# Patient Record
Sex: FEMALE | Race: WHITE | URBAN - METROPOLITAN AREA
[De-identification: names, ages, dates, MRNs, and addresses within clinical notes are randomized per-mention and may not be internally consistent; named-entity substitution may affect disease eponyms.]

---

## 2017-03-21 ENCOUNTER — OFFICE VISIT (OUTPATIENT)
Dept: DERMATOLOGY | Facility: CLINIC | Age: 27
End: 2017-03-21

## 2017-03-21 VITALS — DIASTOLIC BLOOD PRESSURE: 76 MMHG | SYSTOLIC BLOOD PRESSURE: 112 MMHG | HEART RATE: 68 BPM

## 2017-03-21 DIAGNOSIS — L65.9 LOSS OF HAIR: Primary | ICD-10-CM

## 2017-03-21 DIAGNOSIS — L21.9 DERMATITIS, SEBORRHEIC: ICD-10-CM

## 2017-03-21 ASSESSMENT — PAIN SCALES - GENERAL: PAINLEVEL: NO PAIN (0)

## 2017-03-21 NOTE — NURSING NOTE
"Dermatology Rooming Note    Cary Flores's goals for this visit include:   Chief Complaint   Patient presents with     Derm Problem     Patient comes to clinic today for hairloss. States \"I think it's better.\"    Katt Reynolds, [facility]    "

## 2017-03-21 NOTE — PROGRESS NOTES
"[facility] Dermatology Note    Dermatology Problem List:  1. Androgenetic alopecia with seborrheic dermatitis  -current treatment: ketoconazole 2x per week, DHS shampoo 2-3x per week, spironolactone 100 mg q AM and 50 mg q PM, HairMax laserband 3x per week, and Vit D supplementation 2000 IU daily  -last labs checked (9/30/16) K, ferritin, iron, iron binding capacity, TSH, and Vit D: wnl aside from slightly low Vit D at 26.  -previous treatments: Rogaine (ineffective)    Encounter Date: Mar 21, 2017    CC:  Chief Complaint   Patient presents with     Derm Problem     Patient comes to clinic today for hairloss. States \"I think it's better.\"     History of Present Illness:  Ms. Cary Flores is a 26 year old female who presents as a follow-up for androgenetic alopecia. The patient was last seen 9/30/16 when her androgenetic alopecia was stable.  She is currently treating her scalp with ketoconazole 2% shampoo daily, spironolactone 50 mg bid, and HairMax laserband 2-3x per week.  She is currently washing her hair every other day, and also washes her scalp with Nizoral number 5 shampoo/conditioner.  At her last visit, labs were checked which included K, ferritin, iron, iron binding capacity, TSH, and Vit D which were all within normal limits except for a mildly low vit D at 26. Her ferritin was 106, and TSH was 1.25, which were both within normal limits.    Ms. Flores initially thought that her hair loss has improved since her last visit, but when she reviewed her pictures from her last visit, she does not think that her hair loss has changed. She reports that her scalp is tender, but denies scalp pain, burning or pruritus.  She denies hair loss elsewhere on her body such as eyebrows or eyelashes. Since her last visit, she has stopped taking Wellbutrin since she has not been able to refill find a provider to refill her prescription in [place], [place]. Recently, she has been diagnosed with HPV and " underwent a conoscopy. She denies any additional changes in her medications or past medical history.      Past Medical History:   Patient Active Problem List   Diagnosis     Loss of hair     Dermatitis, seborrheic     History reviewed. No pertinent past medical history.  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    Family History:  There is no family history of hair loss.     Medications:  Current Outpatient Prescriptions   Medication Sig Dispense Refill     ketoconazole (NIZORAL) 2 % shampoo Use to shampoo scalp/hair; lather into scalp and leave in for 3 minutes and wash out twice per week 120 mL 3     spironolactone (ALDACTONE) 50 MG tablet Take 1 tablet (50 mg) by mouth 2 times daily 180 tablet 3     HYDROXYZINE HCL PO Take 25 mg by mouth as needed Every to 6 hours PRN       clobetasol propionate 0.05 % SHAM Apply to dry scalp, leave in for 10 minutes and wash out; do this once weekly. 118 mL 3     PENICILLIN V POTASSIUM PO Take 2 tablets by mouth every 12 hours Reported on 3/21/2017       Fluocinolone Acetonide (DERMA-SMOOTHE/FS SCALP) 0.01 % OIL Use this once per week overnight, may use up to twice per week. (Patient not taking: Reported on 3/21/2017) 118 mL 3     BuPROPion HCl (WELLBUTRIN PO) Take 150 mg by mouth 2 times daily Reported on 3/21/2017       No Known Allergies    Review of Systems:  -Constitutional: The patient denies fatigue, fevers, chills, unintended weight loss, and night sweats.  -HEENT: Patient denies nonhealing oral sores.  -Skin: As above in HPI. No additional skin concerns.    Physical exam:  Vitals: /76 (BP Location: Left arm, Patient Position: Chair, Cuff Size: Adult Regular)  Pulse 68  GEN: This is a well developed, well-nourished female in no acute distress, in a pleasant mood.    SKIN: Focused examination of the face, scalp, and fingernails  was performed.  -midline part width 1.2  -regrowth layers:    1st layer: 1-2 cm fibers   2nd layer: 4-5 cm fibers with 80% of the fibers containing blunt ends   3rd layer 8-10 cm fibers with blunt ends present   4th layer: 12-14 cm fibers   -patchy focal pinkness present on frontotemporal scalp  -mild follicular accentuation and scale present  -no hair loss present on eyebrows or eyelashes  -mild longitudinal ridging present on bilateral fingernails  -No other lesions of concern on areas examined.     Impression/Plan:  1. Androgenetic alopecia: Increased scalp regrowth evident by a tighter midline part width present on examination when compared to last visit photos.  Patchy, focal pinkness present along scalp and given reported mild scalp tingling after application of the Nioxin shampoo, there is concern that an irritant or contact dermatitis is present resulting in scalp inflammation    Recommended Ms. Flores perform a use test with the Nixoin shampoo/conditioner on her volar wrist. It reaction occurs, stop use of shampoo/conditioner.    Advised switching to DHS clear shampoo to wash scalp on days not using the ketoconazole shampoo.      Continue to use ketoconazole 2% shampoo at least 2x per week    Increase dose of spironolactone to 100 mg q AM and 50 mg q PM    Continue HairMax laserband 3x per week    Given borderline low Vit D when checked at her last visit (on 9/30/16) recommended starting an OTC Vit D supplement 2000 IU daily    Provided information on a dermatologist in [place], [place] who trained at the [facility], Dr. Anahi Barlow, if she needs a dermatologist closer to where she lives.     Photographs taken for future reference     Follow-up in 4 months, earlier for new or changing lesions.     Staff Involved:  Scribed by Anjana Turner, MS4 for Dr. Hall.        I agree with the PFSH and ROS as completed by the Medical Student. The remainder of the encounter was performed by me and scribed by the  Medical Student. The scribed note accurately reflects my personal services and the medical decisions made by me.      Michelle Hall MD  Professor and Chair  Department of Dermatology  [facility]    Cc  Dr. Anahi Barlow, [place], [place]

## 2017-03-21 NOTE — MR AVS SNAPSHOT
After Visit Summary   3/21/2017    Cary Flores    MRN: [id number]           Patient Information     Date Of Birth          1990        Visit Information        Provider Department      3/21/2017 2:20 PM Michelle Hall MD [facility] Dermatology        Today's Diagnoses     Loss of hair    -  1    Dermatitis, seborrheic           Follow-ups after your visit        Who to contact     Please call your clinic at [phone] to:    Ask questions about your health    Make or cancel appointments    Discuss your medicines    Learn about your test results    Speak to your doctor   If you have compliments or concerns about an experience at your clinic, or if you wish to file a complaint, please contact [facility] Patient Relations at [phone] or email us at [email or web address]         Additional Information About Your Visit        MyChart Information     Yanadot is an electronic gateway that provides easy, online access to your medical records. With mobilePeople, you can request a clinic appointment, read your test results, renew a prescription or communicate with your care team.     To sign up for Yanadot visit the website at [email or web address]   You will be asked to enter the access code listed below, as well as some personal information. Please follow the directions to create your username and password.     Your access code is: [email or web address]  Expires: 2017  7:30 AM     Your access code will  in 90 days. If you need help or a new code, please contact your [facility] or call [phone] for assistance.        Care EveryWhere ID     This is your Care EveryWhere ID. This could be used by other organizations to access your [place] medical records  [id number]        Your Vitals Were     Pulse                   68            Blood Pressure from Last 3 Encounters:   17 112/76   16 120/79    03/14/16 116/78    Weight from Last 3 Encounters:   03/14/16 70.3 kg (155 lb)   12/08/15 74.1 kg (163 lb 6.4 oz)              Today, you had the following     No orders found for display         Today's Medication Changes          These changes are accurate as of: 3/21/17 11:59 PM.  If you have any questions, ask your nurse or doctor.               These medicines have changed or have updated prescriptions.        Dose/Directions    * spironolactone 50 MG tablet   Commonly known as:  ALDACTONE   This may have changed:  Another medication with the same name was added. Make sure you understand how and when to take each.   Used for:  Androgenetic alopecia   Changed by:  Michelle Hall MD        Dose:  50 mg   Take 1 tablet (50 mg) by mouth 2 times daily   Quantity:  180 tablet   Refills:  3       * spironolactone 50 MG tablet   Commonly known as:  ALDACTONE   This may have changed:  You were already taking a medication with the same name, and this prescription was added. Make sure you understand how and when to take each.   Used for:  Loss of hair   Changed by:  Michelle Hall MD        Take 2 tablets (100 mg) in the morning, and one tablet (50mg) in the evening every day.   Quantity:  120 tablet   Refills:  3       * Notice:  This list has 2 medication(s) that are the same as other medications prescribed for you. Read the directions carefully, and ask your doctor or other care provider to review them with you.         Where to get your medicines      These medications were [facility] to [facility], [facility] [place] [place] [address]  [address] [place] [address]     Phone:  [phone]     spironolactone 50 MG tablet                Primary Care Provider Office Phone # Fax #    Jose David Sandhu [phone]       [facility] [address] [place] [id number]        Thank you!     Thank you for choosing [facility] DERMATOLOGY  for your  care. Our goal is always to provide you with excellent care. Hearing back from our patients is one way we can continue to improve our services. Please take a few minutes to complete the written survey that you may receive in the mail after your visit with us. Thank you!             Your Updated Medication List - Protect others around you: Learn how to safely use, store and throw away your medicines at www.disposemymeds.org.          This list is accurate as of: 3/21/17 11:59 PM.  Always use your most recent med list.                   Brand Name Dispense Instructions for use    clobetasol propionate 0.05 % Sham     118 mL    Apply to dry scalp, leave in for 10 minutes and wash out; do this once weekly.       DERMA-SMOOTHE/FS SCALP 0.01 % Oil     118 mL    Use this once per week overnight, may use up to twice per week.       HYDROXYZINE HCL PO      Take 25 mg by mouth as needed Every to 6 hours PRN       ketoconazole 2 % shampoo    NIZORAL    120 mL    Use to shampoo scalp/hair; lather into scalp and leave in for 3 minutes and wash out twice per week       PENICILLIN V POTASSIUM PO      Take 2 tablets by mouth every 12 hours Reported on 3/21/2017       * spironolactone 50 MG tablet    ALDACTONE    180 tablet    Take 1 tablet (50 mg) by mouth 2 times daily       * spironolactone 50 MG tablet    ALDACTONE    120 tablet    Take 2 tablets (100 mg) in the morning, and one tablet (50mg) in the evening every day.       WELLBUTRIN PO      Take 150 mg by mouth 2 times daily Reported on 3/21/2017       * Notice:  This list has 2 medication(s) that are the same as other medications prescribed for you. Read the directions carefully, and ask your doctor or other care provider to review them with you.

## 2017-03-21 NOTE — LETTER
"3/21/2017       RE: Cary Flores  [address]  [place] [address]     Dear Colleague,    Thank you for referring your patient, Cayr Flores, to the [facility] DERMATOLOGY at [facility]. Please see a copy of my visit note below.    [facility] Dermatology Note    Dermatology Problem List:  1. Androgenetic alopecia with seborrheic dermatitis  -current treatment: ketoconazole 2x per week, DHS shampoo 2-3x per week, spironolactone 100 mg q AM and 50 mg q PM, HairMax laserband 3x per week, and Vit D supplementation 2000 IU daily  -last labs checked (9/30/16) K, ferritin, iron, iron binding capacity, TSH, and Vit D: wnl aside from slightly low Vit D at 26.  -previous treatments: Rogaine (ineffective)    Encounter Date: Mar 21, 2017    CC:  Chief Complaint   Patient presents with     Derm Problem     Patient comes to clinic today for hairloss. States \"I think it's better.\"     History of Present Illness:  Ms. Cary Flores is a 26 year old female who presents as a follow-up for androgenetic alopecia. The patient was last seen 9/30/16 when her androgenetic alopecia was stable.  She is currently treating her scalp with ketoconazole 2% shampoo daily, spironolactone 50 mg bid, and HairMax laserband 2-3x per week.  She is currently washing her hair every other day, and also washes her scalp with Nizoral number 5 shampoo/conditioner.  At her last visit, labs were checked which included K, ferritin, iron, iron binding capacity, TSH, and Vit D which were all within normal limits except for a mildly low vit D at 26. Her ferritin was 106, and TSH was 1.25, which were both within normal limits.    Ms. Flores initially thought that her hair loss has improved since her last visit, but when she reviewed her pictures from her last visit, she does not think that her hair loss has changed. She reports that her scalp is tender, but denies scalp pain, " burning or pruritus.  She denies hair loss elsewhere on her body such as eyebrows or eyelashes. Since her last visit, she has stopped taking Wellbutrin since she has not been able to refill find a provider to refill her prescription in [place], [place]. Recently, she has been diagnosed with HPV and underwent a conoscopy. She denies any additional changes in her medications or past medical history.      Past Medical History:   Patient Active Problem List   Diagnosis     Loss of hair     Dermatitis, seborrheic     History reviewed. No pertinent past medical history.  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    Family History:  There is no family history of hair loss.     Medications:  Current Outpatient Prescriptions   Medication Sig Dispense Refill     ketoconazole (NIZORAL) 2 % shampoo Use to shampoo scalp/hair; lather into scalp and leave in for 3 minutes and wash out twice per week 120 mL 3     spironolactone (ALDACTONE) 50 MG tablet Take 1 tablet (50 mg) by mouth 2 times daily 180 tablet 3     HYDROXYZINE HCL PO Take 25 mg by mouth as needed Every to 6 hours PRN       clobetasol propionate 0.05 % SHAM Apply to dry scalp, leave in for 10 minutes and wash out; do this once weekly. 118 mL 3     PENICILLIN V POTASSIUM PO Take 2 tablets by mouth every 12 hours Reported on 3/21/2017       Fluocinolone Acetonide (DERMA-SMOOTHE/FS SCALP) 0.01 % OIL Use this once per week overnight, may use up to twice per week. (Patient not taking: Reported on 3/21/2017) 118 mL 3     BuPROPion HCl (WELLBUTRIN PO) Take 150 mg by mouth 2 times daily Reported on 3/21/2017       No Known Allergies    Review of Systems:  -Constitutional: The patient denies fatigue, fevers, chills, unintended weight loss, and night sweats.  -HEENT: Patient denies nonhealing oral sores.  -Skin: As above in HPI. No additional  skin concerns.    Physical exam:  Vitals: /76 (BP Location: Left arm, Patient Position: Chair, Cuff Size: Adult Regular)  Pulse 68  GEN: This is a well developed, well-nourished female in no acute distress, in a pleasant mood.    SKIN: Focused examination of the face, scalp, and fingernails was performed.  -midline part width 1.2  -regrowth layers:    1st layer: 1-2 cm fibers   2nd layer: 4-5 cm fibers with 80% of the fibers containing blunt ends   3rd layer 8-10 cm fibers with blunt ends present   4th layer: 12-14 cm fibers   -patchy focal pinkness present on frontotemporal scalp  -mild follicular accentuation and scale present  -no hair loss present on eyebrows or eyelashes  -mild longitudinal ridging present on bilateral fingernails  -No other lesions of concern on areas examined.     Impression/Plan:  1. Androgenetic alopecia: Increased scalp regrowth evident by a tighter midline part width present on examination when compared to last visit photos.  Patchy, focal pinkness present along scalp and given reported mild scalp tingling after application of the Nioxin shampoo, there is concern that an irritant or contact dermatitis is present resulting in scalp inflammation    Recommended Ms. Flores perform a use test with the Nixoin shampoo/conditioner on her volar wrist. It reaction occurs, stop use of shampoo/conditioner.    Advised switching to DHS clear shampoo to wash scalp on days not using the ketoconazole shampoo.      Continue to use ketoconazole 2% shampoo at least 2x per week    Increase dose of spironolactone to 100 mg q AM and 50 mg q PM    Continue HairMax laserband 3x per week    Given borderline low Vit D when checked at her last visit (on 9/30/16) recommended starting an OTC Vit D supplement 2000 IU daily    Provided information on a dermatologist in [place], [place] who trained at the [facility], Dr. Anahi Barlow, if she needs a dermatologist closer to where she lives.     Photographs  taken for future reference     Follow-up in 4 months, earlier for new or changing lesions.     Staff Involved:  Scribed by Anjana Turner, MS4 for Dr. Hall.        I agree with the PFSH and ROS as completed by the Medical Student. The remainder of the encounter was performed by me and scribed by the Medical Student. The scribed note accurately reflects my personal services and the medical decisions made by me.      Michelle Hall MD  Professor and Chair  Department of Dermatology  [facility]    Cc  Dr. Anahi Barlow, [place], [place]                    Again, thank you for allowing me to participate in the care of your patient.      Sincerely,    Michelle Hall MD

## 2017-04-30 RX ORDER — SPIRONOLACTONE 50 MG/1
TABLET, FILM COATED ORAL
Qty: 120 TABLET | Refills: 3 | Status: SHIPPED | OUTPATIENT
Start: 2017-04-30

## 2017-08-01 ENCOUNTER — OFFICE VISIT (OUTPATIENT)
Dept: DERMATOLOGY | Facility: CLINIC | Age: 27
End: 2017-08-01

## 2017-08-01 VITALS — SYSTOLIC BLOOD PRESSURE: 118 MMHG | HEART RATE: 77 BPM | DIASTOLIC BLOOD PRESSURE: 81 MMHG

## 2017-08-01 DIAGNOSIS — L21.9 DERMATITIS, SEBORRHEIC: ICD-10-CM

## 2017-08-01 DIAGNOSIS — L65.9 LOSS OF HAIR: ICD-10-CM

## 2017-08-01 DIAGNOSIS — L64.9 ANDROGENETIC ALOPECIA: Primary | ICD-10-CM

## 2017-08-01 RX ORDER — KETOCONAZOLE 20 MG/ML
SHAMPOO TOPICAL
Qty: 120 ML | Refills: 3 | Status: SHIPPED | OUTPATIENT
Start: 2017-08-01

## 2017-08-01 RX ORDER — VENLAFAXINE HYDROCHLORIDE 75 MG/1
CAPSULE, EXTENDED RELEASE ORAL DAILY
COMMUNITY

## 2017-08-01 RX ORDER — TRAZODONE HYDROCHLORIDE 50 MG/1
TABLET, FILM COATED ORAL AT BEDTIME
COMMUNITY

## 2017-08-01 RX ORDER — FLUOCINOLONE ACETONIDE 0.11 MG/ML
OIL TOPICAL
Qty: 118.28 ML | Refills: 1 | Status: SHIPPED | OUTPATIENT
Start: 2017-08-01

## 2017-08-01 ASSESSMENT — PAIN SCALES - GENERAL: PAINLEVEL: NO PAIN (0)

## 2017-08-01 NOTE — LETTER
"8/1/2017       RE: Cary Flores  [address]  [place] [address]     Dear Colleague,    Thank you for referring your patient, Cary Flores, to the [facility] DERMATOLOGY at [facility]. Please see a copy of my visit note below.    [facility] Dermatology Note      Dermatology Problem List:  1. Androgenetic alopecia with seborrheic dermatitis  -current treatment: ketoconazole 2x per week, DHS shampoo 2-3x per week, spironolactone 100 mg q AM and 50 mg q PM, HairMax laserband 3x per week, and Vit D supplementation 2000 IU daily  -last labs checked (9/30/16) K, ferritin, iron, iron binding capacity, TSH, and Vit D: wnl aside from slightly low Vit D at 26.  -previous treatments: Rogaine (ineffective)    Encounter Date: Aug 1, 2017    CC:   Chief Complaint   Patient presents with     Hair/Scalp Problem     Cary comes to clinic today for Androgenetic alopecia. States \"it's worse in different places.\"         History of Present Illness:  Ms. Cary Flores is a 26 year old female who presents as a follow-up for androgenetic alopecia. The patient was last seen 3/21/17 when treatment plan included to use ketoconazole 2% shampoo 2x/week, spironolactone 100 mg qam and 50 mg qpm, started OTC vitamin D 2000 IU qday, and continue HairMax laserband 3x/week. Also recommended to use DHS clear shampoo on days not using ketoconazole.    The patient is doing the above regimen, except she is not taking the vitamin D supplement. For shampoo she uses Free and Clear. She does not like the ketoconazole as it makes her shower routine longer, but she still continues it.  She would like to decrease her spironolactone as she thinks it is making her more irritable.    She thinks that on the left side she is gaining hair, more  thinning and \"breakable\" hairs on the right side.  No associated scalp irritation, burning, or itching.    Thinks she has had some weight " loss. Describes mood swings. Denies big life stressors at the moment.      Past Medical History:   Patient Active Problem List   Diagnosis     Loss of hair     Dermatitis, seborrheic     History reviewed. No pertinent past medical history.  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     Family History:  There is no family history of hair loss.     Medications:  Current Outpatient Prescriptions   Medication Sig Dispense Refill     venlafaxine (EFFEXOR XR) 75 MG 24 hr capsule Take by mouth daily       traZODone (DESYREL) 50 MG tablet Take by mouth At Bedtime       spironolactone (ALDACTONE) 50 MG tablet Take 2 tablets (100 mg) in the morning, and one tablet (50mg) in the evening every day. 120 tablet 3     PENICILLIN V POTASSIUM PO Take 2 tablets by mouth every 12 hours Reported on 3/21/2017       ketoconazole (NIZORAL) 2 % shampoo Use to shampoo scalp/hair; lather into scalp and leave in for 3 minutes and wash out twice per week 120 mL 3     spironolactone (ALDACTONE) 50 MG tablet Take 1 tablet (50 mg) by mouth 2 times daily 180 tablet 3     Fluocinolone Acetonide (DERMA-SMOOTHE/FS SCALP) 0.01 % OIL Use this once per week overnight, may use up to twice per week. 118 mL 3     HYDROXYZINE HCL PO Take 25 mg by mouth as needed Every to 6 hours PRN       clobetasol propionate 0.05 % SHAM Apply to dry scalp, leave in for 10 minutes and wash out; do this once weekly. 118 mL 3        No Known Allergies      Review of Systems:  -As per HPI  -Constitutional: The patient denies fatigue, fevers, chills, unintended weight loss, and night sweats.  -Skin: As above in HPI. No additional skin concerns.    Physical exam:  Vitals: /81 (BP Location: Left arm, Patient Position: Sitting, Cuff Size: Adult Regular)  Pulse 77  GEN: This is a well developed, well-nourished female in no acute  distress, in a pleasant mood.    SKIN: Focused examination of the head, neck  and scalp was performed.  -Part width 1.2 over scalp and 1.3 at vertex  1st layer- 1-2 cm  2nd layer- 4-5 cm  3rd layer- 8-10 cm  4th layer- 12-14 cm  -Improved erythema of scalp, slight faint patch, with follicular accentuation, no perifollicular erythema or plugging  -Hair thinning occipital scalp  -No other lesions of concern on areas examined.     Impression/Plan:  1. Androgenetic alopecia with seborrheic dermatitis- There is evidence of hair regrowth with slightly tigheter midline part width and temporal fine hair regrowth. Over the occipital scalp there appears to more thinning. Overall, the patient is doing better. Reasonable to decrease spironolactone, as she attributes increased irritation to it. The patient     Encouraged to start vitamin D 2000 u qday    Ok to decrease spironolactone to 100 mg qday (from 150 mg qday)    Start dermasmoothe FS 0.01% oil qweek    Continue ketoconazole shampoo 2x/week, continue Free and clear shampoo on other days    Continue HairMax Band 3x/week    Future treatment options can include Rogaine if not improving        CC Dr. Hall on close of this encounter.  Follow-up in 4 months, earlier for new or changing lesions.       Dr. Hall staffed the patient.    Staff Involved:  Resident(Armand Hunt)/Staff(as above)      Patient was seen and examined with the medicine/dermatology resident. I agree with the history, review of systems, physical examination, assessments and plan.    Michelle Hall MD  Professor and  Chair  Department of Dermatology  [facility]                    Pictures taken of patient today to be placed in chart for future reference.      Again, thank you for allowing me to participate in the care of your patient.      Sincerely,    Michelle Hall MD

## 2017-08-01 NOTE — NURSING NOTE
"Dermatology Rooming Note    Cary Flores's goals for this visit include:   Chief Complaint   Patient presents with     Hair/Scalp Problem     Cary comes to clinic today for Androgenetic alopecia. States \"it's worse in different places.\"     Katt Reynolds, [facility]    "

## 2017-08-01 NOTE — MR AVS SNAPSHOT
After Visit Summary   2017    Cary Flores    MRN: [id number]           Patient Information     Date Of Birth          1990        Visit Information        Provider Department      2017 2:05 PM Michelle Hall MD [facility] Dermatology        Today's Diagnoses     Androgenetic alopecia    -  1    Loss of hair          Care Instructions      Take vitamin D supplement 2000 units once a day    Ok to decrease spironolactone to 100 mg qday    Start dermasmoothe oil once a week    Continue HairComb 3x/week    Continue ketoconazole shampoo 2x/week          Follow-ups after your visit        Follow-up notes from your care team     Return in about 4 months (around 2017).      Who to contact     Please call your clinic at [phone] to:    Ask questions about your health    Make or cancel appointments    Discuss your medicines    Learn about your test results    Speak to your doctor   If you have compliments or concerns about an experience at your clinic, or if you wish to file a complaint, please contact [facility] Patient Relations at [phone] or email us at [email or web address]         Additional Information About Your Visit        MyChart Information     Cloudacct is an electronic gateway that provides easy, online access to your medical records. With [employer] LS15, you can request a clinic appointment, read your test results, renew a prescription or communicate with your care team.     To sign up for Cloudacct visit the website at [email or web address]   You will be asked to enter the access code listed below, as well as some personal information. Please follow the directions to create your username and password.     Your access code is: [email or web address]  Expires: 10/16/2017  6:31 AM     Your access code will  in 90 days. If you need help or a new code, please contact your [facility] Clinic or call  [phone] for assistance.        Care EveryWhere ID     This is your Care EveryWhere ID. This could be used by other organizations to access your [facility] medical records  [id number]        Your Vitals Were     Pulse                   77            Blood Pressure from Last 3 Encounters:   08/01/17 118/81   03/21/17 112/76   06/13/16 120/79    Weight from Last 3 Encounters:   03/14/16 70.3 kg (155 lb)   12/08/15 74.1 kg (163 lb 6.4 oz)              Today, you had the following     No orders found for display         Today's Medication Changes          These changes are accurate as of: 8/1/17  3:01 PM.  If you have any questions, ask your nurse or doctor.               These medicines have changed or have updated prescriptions.        Dose/Directions    * DERMA-SMOOTHE/FS SCALP 0.01 % Oil   This may have changed:  Another medication with the same name was added. Make sure you understand how and when to take each.   Used for:  Loss of hair   Changed by:  Michelle Hall MD        Use this once per week overnight, may use up to twice per week.   Quantity:  118 mL   Refills:  3       * fluocinolone acetaonide 0.01 % oil   This may have changed:  You were already taking a medication with the same name, and this prescription was added. Make sure you understand how and when to take each.   Used for:  Androgenetic alopecia   Changed by:  Michelle Hall MD        Apply topically every 7 days   Quantity:  118.28 mL   Refills:  1       * Notice:  This list has 2 medication(s) that are the same as other medications prescribed for you. Read the directions carefully, and ask your doctor or other care provider to review them with you.      Stop taking these medicines if you haven't already. Please contact your care team if you have questions.     WELLBUTRIN PO   Stopped by:  Michelle Hall MD                Where to get your medicines      These medications were [place] to [facility]  [place] [address]  [address], [place] [address]     Phone:  [phone]     fluocinolone acetaonide 0.01 % oil    ketoconazole 2 % shampoo                Primary Care Provider Office Phone # Fax #    Jose David Sandhu [phone]       [facility] [address] [facility] [address]        Equal Access to Services     KIRSTEN PASCUAL : Hadii aad ku hadasho Soomaali, waaxda luqadaha, qaybta kaalmada adeegyada, waxay idiin hayaan adeeg kharash lamaricel ah. So [facility] [phone].    ATENCIÓN: Si habla español, tiene a chavez disposición servicios gratuitos de asistencia lingüística. [facility] [phone].    We comply with applicable federal civil rights laws and [place] laws. We do not discriminate on the basis of race, color, national origin, age, disability sex, sexual orientation or gender identity.            Thank you!     Thank you for choosing [facility] DERMATOLOGY  for your care. Our goal is always to provide you with excellent care. Hearing back from our patients is one way we can continue to improve our services. Please take a few minutes to complete the written survey that you may receive in the mail after your visit with us. Thank you!             Your Updated Medication List - Protect others around you: Learn how to safely use, store and throw away your medicines at www.disposemymeds.org.          This list is accurate as of: 8/1/17  3:01 PM.  Always use your most recent med list.                   Brand Name Dispense Instructions for use Diagnosis    clobetasol propionate 0.05 % Sham     118 mL    Apply to dry scalp, leave in for 10 minutes and wash out; do this once weekly.    Loss of hair       * DERMA-SMOOTHE/FS SCALP 0.01 % Oil     118 mL    Use this once per week overnight, may use up to twice per week.    Loss of hair       * fluocinolone acetaonide 0.01 % oil     118.28 mL    Apply topically every 7 days    Androgenetic alopecia        EFFEXOR XR 75 MG 24 hr capsule   Generic drug:  venlafaxine      Take by mouth daily        HYDROXYZINE HCL PO      Take 25 mg by mouth as needed Every to 6 hours PRN    Loss of hair       ketoconazole 2 % shampoo    NIZORAL    120 mL    Use to shampoo scalp/hair; lather into scalp and leave in for 3 minutes and wash out twice per week    Loss of hair       PENICILLIN V POTASSIUM PO      Take 2 tablets by mouth every 12 hours Reported on 3/21/2017        * spironolactone 50 MG tablet    ALDACTONE    180 tablet    Take 1 tablet (50 mg) by mouth 2 times daily    Androgenetic alopecia       * spironolactone 50 MG tablet    ALDACTONE    120 tablet    Take 2 tablets (100 mg) in the morning, and one tablet (50mg) in the evening every day.    Loss of hair       traZODone 50 MG tablet    DESYREL     Take by mouth At Bedtime        * Notice:  This list has 4 medication(s) that are the same as other medications prescribed for you. Read the directions carefully, and ask your doctor or other care provider to review them with you.

## 2017-08-01 NOTE — PATIENT INSTRUCTIONS
Take vitamin D supplement 2000 units once a day    Ok to decrease spironolactone to 100 mg qday    Start dermasmoothe oil once a week    Continue HairComb 3x/week    Continue ketoconazole shampoo 2x/week

## 2017-08-01 NOTE — PROGRESS NOTES
"[facility] Dermatology Note      Dermatology Problem List:  1. Androgenetic alopecia with seborrheic dermatitis  -current treatment: ketoconazole 2x per week, DHS shampoo 2-3x per week, spironolactone 100 mg q AM and 50 mg q PM, HairMax laserband 3x per week, and Vit D supplementation 2000 IU daily  -last labs checked (9/30/16) K, ferritin, iron, iron binding capacity, TSH, and Vit D: wnl aside from slightly low Vit D at 26.  -previous treatments: Rogaine (ineffective)    Encounter Date: Aug 1, 2017    CC:   Chief Complaint   Patient presents with     Hair/Scalp Problem     Cary comes to clinic today for Androgenetic alopecia. States \"it's worse in different places.\"         History of Present Illness:  Ms. Cary Flores is a 26 year old female who presents as a follow-up for androgenetic alopecia. The patient was last seen 3/21/17 when treatment plan included to use ketoconazole 2% shampoo 2x/week, spironolactone 100 mg qam and 50 mg qpm, started OTC vitamin D 2000 IU qday, and continue HairMax laserband 3x/week. Also recommended to use DHS clear shampoo on days not using ketoconazole.    The patient is doing the above regimen, except she is not taking the vitamin D supplement. For shampoo she uses Free and Clear. She does not like the ketoconazole as it makes her shower routine longer, but she still continues it.  She would like to decrease her spironolactone as she thinks it is making her more irritable.    She thinks that on the left side she is gaining hair, more  thinning and \"breakable\" hairs on the right side.  No associated scalp irritation, burning, or itching.    Thinks she has had some weight loss. Describes mood swings. Denies big life stressors at the moment.      Past Medical History:   Patient Active Problem List   Diagnosis     Loss of hair     Dermatitis, seborrheic     History reviewed. No pertinent past medical history.  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     Family History:  There is no family history of hair loss.     Medications:  Current Outpatient Prescriptions   Medication Sig Dispense Refill     venlafaxine (EFFEXOR XR) 75 MG 24 hr capsule Take by mouth daily       traZODone (DESYREL) 50 MG tablet Take by mouth At Bedtime       spironolactone (ALDACTONE) 50 MG tablet Take 2 tablets (100 mg) in the morning, and one tablet (50mg) in the evening every day. 120 tablet 3     PENICILLIN V POTASSIUM PO Take 2 tablets by mouth every 12 hours Reported on 3/21/2017       ketoconazole (NIZORAL) 2 % shampoo Use to shampoo scalp/hair; lather into scalp and leave in for 3 minutes and wash out twice per week 120 mL 3     spironolactone (ALDACTONE) 50 MG tablet Take 1 tablet (50 mg) by mouth 2 times daily 180 tablet 3     Fluocinolone Acetonide (DERMA-SMOOTHE/FS SCALP) 0.01 % OIL Use this once per week overnight, may use up to twice per week. 118 mL 3     HYDROXYZINE HCL PO Take 25 mg by mouth as needed Every to 6 hours PRN       clobetasol propionate 0.05 % SHAM Apply to dry scalp, leave in for 10 minutes and wash out; do this once weekly. 118 mL 3        No Known Allergies      Review of Systems:  -As per HPI  -Constitutional: The patient denies fatigue, fevers, chills, unintended weight loss, and night sweats.  -Skin: As above in HPI. No additional skin concerns.    Physical exam:  Vitals: /81 (BP Location: Left arm, Patient Position: Sitting, Cuff Size: Adult Regular)  Pulse 77  GEN: This is a well developed, well-nourished female in no acute distress, in a pleasant mood.    SKIN: Focused examination of the head, neck  and scalp was performed.  -Part width 1.2 over scalp and 1.3 at vertex  1st layer- 1-2 cm  2nd layer- 4-5 cm  3rd layer- 8-10 cm  4th layer- 12-14 cm  -Improved erythema of scalp, slight faint patch, with follicular  accentuation, no perifollicular erythema or plugging  -Hair thinning occipital scalp  -No other lesions of concern on areas examined.     Impression/Plan:  1. Androgenetic alopecia with seborrheic dermatitis- There is evidence of hair regrowth with slightly tigheter midline part width and temporal fine hair regrowth. Over the occipital scalp there appears to more thinning. Overall, the patient is doing better. Reasonable to decrease spironolactone, as she attributes increased irritation to it. The patient     Encouraged to start vitamin D 2000 u qday    Ok to decrease spironolactone to 100 mg qday (from 150 mg qday)    Start dermasmoothe FS 0.01% oil qweek    Continue ketoconazole shampoo 2x/week, continue Free and clear shampoo on other days    Continue HairMax Band 3x/week    Future treatment options can include Rogaine if not improving        CC Dr. Hall on close of this encounter.  Follow-up in 4 months, earlier for new or changing lesions.       Dr. Hall staffed the patient.    Staff Involved:  Resident(Armand Hunt)/Staff(as above)      Patient was seen and examined with the medicine/dermatology resident. I agree with the history, review of systems, physical examination, assessments and plan.    Michelle Hall MD  Professor and  Chair  Department of Dermatology  [facility]

## 2018-02-20 ENCOUNTER — TELEPHONE (OUTPATIENT)
Dept: DERMATOLOGY | Facility: CLINIC | Age: 28
End: 2018-02-20

## 2018-02-20 NOTE — TELEPHONE ENCOUNTER
"She is requesting a referral for a dermatologist in her area of [facility]. Encouraged her to contact her insurance company to be sure that she is covered there. She states Dr Hall gave her names of providers to see previously but does not remember them. Informed her that Dr Hall is out of the clinic until next week. She would also like to be scheduled for a follow up appt. Patient's information will be routed to [place].     \"Pt is requesting a referral from Dr. Hall to [facility] in [place], [place] so she can see someone earlier. [place]'s address is [address], phone #: [phone], not sure about the fax. Pt stated she has talked to Dr. Hall about this before. Please call pt at [phone] for any questions, or for confirming that the referral was sent.\"  "

## 2018-03-30 ENCOUNTER — OFFICE VISIT (OUTPATIENT)
Dept: DERMATOLOGY | Facility: CLINIC | Age: 28
End: 2018-03-30
Payer: COMMERCIAL

## 2018-03-30 VITALS — DIASTOLIC BLOOD PRESSURE: 77 MMHG | HEART RATE: 55 BPM | SYSTOLIC BLOOD PRESSURE: 121 MMHG

## 2018-03-30 DIAGNOSIS — L65.9 LOSS OF HAIR: Primary | ICD-10-CM

## 2018-03-30 DIAGNOSIS — L65.9 LOSS OF HAIR: ICD-10-CM

## 2018-03-30 DIAGNOSIS — R79.89 LOW VITAMIN D LEVEL: ICD-10-CM

## 2018-03-30 DIAGNOSIS — L21.9 DERMATITIS, SEBORRHEIC: ICD-10-CM

## 2018-03-30 LAB
BASOPHILS # BLD AUTO: 0.1 10E9/L (ref 0–0.2)
BASOPHILS NFR BLD AUTO: 0.9 %
DEPRECATED CALCIDIOL+CALCIFEROL SERPL-MC: 12 UG/L (ref 20–75)
DIFFERENTIAL METHOD BLD: NORMAL
EOSINOPHIL # BLD AUTO: 0.1 10E9/L (ref 0–0.7)
EOSINOPHIL NFR BLD AUTO: 1.1 %
ERYTHROCYTE [DISTWIDTH] IN BLOOD BY AUTOMATED COUNT: 11.9 % (ref 10–15)
FERRITIN SERPL-MCNC: 69 NG/ML (ref 12–150)
HCT VFR BLD AUTO: 43.7 % (ref 35–47)
HGB BLD-MCNC: 14.5 G/DL (ref 11.7–15.7)
IMM GRANULOCYTES # BLD: 0 10E9/L (ref 0–0.4)
IMM GRANULOCYTES NFR BLD: 0.4 %
IRON SATN MFR SERPL: 24 % (ref 15–46)
IRON SERPL-MCNC: 77 UG/DL (ref 35–180)
LYMPHOCYTES # BLD AUTO: 2.1 10E9/L (ref 0.8–5.3)
LYMPHOCYTES NFR BLD AUTO: 38.8 %
MCH RBC QN AUTO: 30.9 PG (ref 26.5–33)
MCHC RBC AUTO-ENTMCNC: 33.2 G/DL (ref 31.5–36.5)
MCV RBC AUTO: 93 FL (ref 78–100)
MONOCYTES # BLD AUTO: 0.3 10E9/L (ref 0–1.3)
MONOCYTES NFR BLD AUTO: 5.8 %
NEUTROPHILS # BLD AUTO: 2.8 10E9/L (ref 1.6–8.3)
NEUTROPHILS NFR BLD AUTO: 53 %
NRBC # BLD AUTO: 0 10*3/UL
NRBC BLD AUTO-RTO: 0 /100
PLATELET # BLD AUTO: 232 10E9/L (ref 150–450)
RBC # BLD AUTO: 4.69 10E12/L (ref 3.8–5.2)
TIBC SERPL-MCNC: 317 UG/DL (ref 240–430)
TSH SERPL DL<=0.005 MIU/L-ACNC: 1.66 MU/L (ref 0.4–4)
WBC # BLD AUTO: 5.3 10E9/L (ref 4–11)

## 2018-03-30 ASSESSMENT — PAIN SCALES - GENERAL: PAINLEVEL: NO PAIN (0)

## 2018-03-30 NOTE — LETTER
3/30/2018       RE: Cary Flores  [address]  [place] [address]     Dear Colleague,    Thank you for referring your patient, Cary Flores, to the [facility] DERMATOLOGY at [facility]. Please see a copy of my visit note below.    [facility] Dermatology Note      Dermatology Problem List:  1. Androgenetic alopecia with seborrheic dermatitis  -current treatment: ketoconazole 2x per week, spironolactone 150 mg q AM, HairMax laserband 3x per week, and Vit D supplementation 2000 IU daily  -Labs 3/30/18; CBC with diff, TSH, iron studies, ferritin, vitamin D   -previous treatments: Rogaine (ineffective)      Encounter Date: Mar 30, 2018    CC:  Chief Complaint   Patient presents with     Derm Problem     Cary is here for a hairloss follow up, states theres some areas that are worse.          History of Present Illness:  Ms. Cary Flores is a 27 year old female who presents accompanied by her mother as a follow-up for androgenetic alopecia with seborrheic dermatitis. The patient was last seen 8/1/17 when spironolactone was decreased from 150 to 100mg QD, as she was forgetting to take the evening dose. She was also recommended to start vitamin D supplementation, which she did for two weeks before stopping. She has been using her Hairmax laser band 1x/w if she remembers. She uses dermasmoothe oil approximately once a month. She is shampooing with ketoconazole twice a week, and nioxin shampoo the rest of the time. She feels that she has more hair loss as well as scalp pruritus on the R posterior scalp. No scalp pain, burning, or tingling. Brows, lashes, and body hair have been stable. No nail changes. No hormone changes. She is not on any other supplements. Her mother is wondering about hair supplements, if they are effective or not. No other concerns today.     Past Medical History:   Patient Active Problem List   Diagnosis     Loss of  hair     Dermatitis, seborrheic     No past medical history on file.  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 She is not sure yet what her plans will be when she is finished.     Family History:  Hair loss on maternal side in maternal grandmother.     Medications:  Current Outpatient Prescriptions   Medication Sig Dispense Refill     BuPROPion HCl (WELLBUTRIN XL PO) Take 300 mg by mouth daily       traZODone (DESYREL) 50 MG tablet Take by mouth At Bedtime       Fluocinolone Acetonide (FLUOCINOLONE ACETAONIDE) 0.01 % oil Apply topically every 7 days 118.28 mL 1     ketoconazole (NIZORAL) 2 % shampoo Use to shampoo scalp/hair; lather into scalp and leave in for 3 minutes and wash out twice per week 120 mL 3     spironolactone (ALDACTONE) 50 MG tablet Take 2 tablets (100 mg) in the morning, and one tablet (50mg) in the evening every day. 120 tablet 3     PENICILLIN V POTASSIUM PO Take 2 tablets by mouth every 12 hours Reported on 3/21/2017       spironolactone (ALDACTONE) 50 MG tablet Take 1 tablet (50 mg) by mouth 2 times daily 180 tablet 3     Fluocinolone Acetonide (DERMA-SMOOTHE/FS SCALP) 0.01 % OIL Use this once per week overnight, may use up to twice per week. 118 mL 3     HYDROXYZINE HCL PO Take 25 mg by mouth as needed Every to 6 hours PRN       clobetasol propionate 0.05 % SHAM Apply to dry scalp, leave in for 10 minutes and wash out; do this once weekly. 118 mL 3     venlafaxine (EFFEXOR XR) 75 MG 24 hr capsule Take by mouth daily       No Known Allergies      Review of Systems:  -As per HPI  -Skin: As above in HPI. No additional skin concerns.    Physical exam:  Vitals: /77 (BP Location: Left arm, Patient Position: Chair, Cuff Size: Adult Regular)  Pulse 55  GEN: This is a well developed, well-nourished female in no acute distress, in a pleasant  mood.    SKIN: Focused examination of the scalp and face was performed.  -Overall stable compared to photographs from last visit  -Follicular accentuation, mild scale  -Patchy erythema, particularly of R posterior scalp  -Good regrowth noted:   1st: 1-2cm   2nd: 4-5cm   3rd: 8-10cm   -Eyebrows and lashes WNL   -Acrylic nails on fingernails   -No other lesions of concern on areas examined.     Impression/Plan:  1. Androgenetic alopecia with seborrheic dermatitis - Encourage pt to use laserband TIW as much as possible. She is willing to increase spironolactone to 150mg QD if she can take all of it in the morning, which is fine. Recommend rechecking labs today as they were last checked in September 2016. Discussed that supplements such as Viviscal and Nutrafol may be beneficial and would not be harmful as long as they are taken at the recommended dose. Discussed using clobetasol shampoo or dermasmoothe oil once a week to help with pruritus and patchy erythema seen today, she is willing to alternate these 1x/w. Recommended using these more frequently or even QD for a few weeks on pruritic patch on R posterior scalp. Also discussed antihistamines as an option if this is not effective in controlling the pruritus.     Increase spironolactone from 100 to 150mg QD, may take entire dose in AM    Use laserband TIW as much as possible    Start clobetasol 0.05% shampoo alternating with dermasmoothe/FS 0.01% oil, 1x/w - use one or the other QD or several times a week on pruritic patch on R posterior scalp for several weeks    Continue ketoconazole 2% shampoo 2x/w    Restart vitamin D 2000IU QD - will follow up with further recommendations when labs are back     Photographs taken for future reference       Follow-up in 4 months, earlier for new or changing lesions.     Staff Involved:  Scribed by Rupinder Sorensen, MS4 for Dr. Hall.        I agree with the PFSH and ROS as completed by the Medical Student. The remainder of the  encounter was performed by me and scribed by the Medical Student. The scribed note accurately reflects my personal services and the medical decisions made by me.      Michelle Hall MD  Professor and Chair  Department of Dermatology  [facility]                      Pictures were placed in Pt's chart today for future reference.      Again, thank you for allowing me to participate in the care of your patient.      Sincerely,    Michelle Hall MD

## 2018-03-30 NOTE — PROGRESS NOTES
[facility] Dermatology Note      Dermatology Problem List:  1. Androgenetic alopecia with seborrheic dermatitis  -current treatment: ketoconazole 2x per week, spironolactone 150 mg q AM, HairMax laserband 3x per week, and Vit D supplementation 2000 IU daily  -Labs 3/30/18; CBC with diff, TSH, iron studies, ferritin, vitamin D   -previous treatments: Rogaine (ineffective)      Encounter Date: Mar 30, 2018    CC:  Chief Complaint   Patient presents with     Derm Problem     Cary is here for a hairloss follow up, states theres some areas that are worse.          History of Present Illness:  Ms. Cary Flores is a 27 year old female who presents accompanied by her mother as a follow-up for androgenetic alopecia with seborrheic dermatitis. The patient was last seen 8/1/17 when spironolactone was decreased from 150 to 100mg QD, as she was forgetting to take the evening dose. She was also recommended to start vitamin D supplementation, which she did for two weeks before stopping. She has been using her Hairmax laser band 1x/w if she remembers. She uses dermasmoothe oil approximately once a month. She is shampooing with ketoconazole twice a week, and nioxin shampoo the rest of the time. She feels that she has more hair loss as well as scalp pruritus on the R posterior scalp. No scalp pain, burning, or tingling. Brows, lashes, and body hair have been stable. No nail changes. No hormone changes. She is not on any other supplements. Her mother is wondering about hair supplements, if they are effective or not. No other concerns today.     Past Medical History:   Patient Active Problem List   Diagnosis     Loss of hair     Dermatitis, seborrheic     No past medical history on file.  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 She is not sure yet what  her plans will be when she is finished.     Family History:  Hair loss on maternal side in maternal grandmother.     Medications:  Current Outpatient Prescriptions   Medication Sig Dispense Refill     BuPROPion HCl (WELLBUTRIN XL PO) Take 300 mg by mouth daily       traZODone (DESYREL) 50 MG tablet Take by mouth At Bedtime       Fluocinolone Acetonide (FLUOCINOLONE ACETAONIDE) 0.01 % oil Apply topically every 7 days 118.28 mL 1     ketoconazole (NIZORAL) 2 % shampoo Use to shampoo scalp/hair; lather into scalp and leave in for 3 minutes and wash out twice per week 120 mL 3     spironolactone (ALDACTONE) 50 MG tablet Take 2 tablets (100 mg) in the morning, and one tablet (50mg) in the evening every day. 120 tablet 3     PENICILLIN V POTASSIUM PO Take 2 tablets by mouth every 12 hours Reported on 3/21/2017       spironolactone (ALDACTONE) 50 MG tablet Take 1 tablet (50 mg) by mouth 2 times daily 180 tablet 3     Fluocinolone Acetonide (DERMA-SMOOTHE/FS SCALP) 0.01 % OIL Use this once per week overnight, may use up to twice per week. 118 mL 3     HYDROXYZINE HCL PO Take 25 mg by mouth as needed Every to 6 hours PRN       clobetasol propionate 0.05 % SHAM Apply to dry scalp, leave in for 10 minutes and wash out; do this once weekly. 118 mL 3     venlafaxine (EFFEXOR XR) 75 MG 24 hr capsule Take by mouth daily       No Known Allergies      Review of Systems:  -As per HPI  -Skin: As above in HPI. No additional skin concerns.    Physical exam:  Vitals: /77 (BP Location: Left arm, Patient Position: Chair, Cuff Size: Adult Regular)  Pulse 55  GEN: This is a well developed, well-nourished female in no acute distress, in a pleasant mood.    SKIN: Focused examination of the scalp and face was performed.  -Overall stable compared to photographs from last visit  -Follicular accentuation, mild scale  -Patchy erythema, particularly of R posterior scalp  -Good regrowth noted:   1st: 1-2cm   2nd: 4-5cm   3rd: 8-10cm    -Eyebrows and lashes WNL   -Acrylic nails on fingernails   -No other lesions of concern on areas examined.     Impression/Plan:  1. Androgenetic alopecia with seborrheic dermatitis - Encourage pt to use laserband TIW as much as possible. She is willing to increase spironolactone to 150mg QD if she can take all of it in the morning, which is fine. Recommend rechecking labs today as they were last checked in September 2016. Discussed that supplements such as Viviscal and Nutrafol may be beneficial and would not be harmful as long as they are taken at the recommended dose. Discussed using clobetasol shampoo or dermasmoothe oil once a week to help with pruritus and patchy erythema seen today, she is willing to alternate these 1x/w. Recommended using these more frequently or even QD for a few weeks on pruritic patch on R posterior scalp. Also discussed antihistamines as an option if this is not effective in controlling the pruritus.     Increase spironolactone from 100 to 150mg QD, may take entire dose in AM    Use laserband TIW as much as possible    Start clobetasol 0.05% shampoo alternating with dermasmoothe/FS 0.01% oil, 1x/w - use one or the other QD or several times a week on pruritic patch on R posterior scalp for several weeks    Continue ketoconazole 2% shampoo 2x/w    Restart vitamin D 2000IU QD - will follow up with further recommendations when labs are back     Photographs taken for future reference       Follow-up in 4 months, earlier for new or changing lesions.     Staff Involved:  Scribed by Rupinder Sorensen, MS4 for Dr. Hall.        I agree with the PFSH and ROS as completed by the Medical Student. The remainder of the encounter was performed by me and scribed by the Medical Student. The scribed note accurately reflects my personal services and the medical decisions made by me.      Michelle Hall MD  Professor and Chair  Department of Dermatology  [facility]

## 2018-03-30 NOTE — MR AVS SNAPSHOT
After Visit Summary   3/30/2018    Cary Flores    MRN: [id number]           Patient Information     Date Of Birth          1990        Visit Information        Provider Department      3/30/2018 10:30 AM Michelle Hall MD [facility] Dermatology        Today's Diagnoses     Loss of hair    -  1    Dermatitis, seborrheic        Low vitamin D level           Follow-ups after your visit        Who to contact     Please call your clinic at [phone] to:    Ask questions about your health    Make or cancel appointments    Discuss your medicines    Learn about your test results    Speak to your doctor            Additional Information About Your Visit        MyChart Information     [employer] is an electronic gateway that provides easy, online access to your medical records. With [employer], you can request a clinic appointment, read your test results, renew a prescription or communicate with your care team.     To sign up for [employer] visit the website at [email or web address]   You will be asked to enter the access code listed below, as well as some personal information. Please follow the directions to create your username and password.     Your access code is: [email or web address]  Expires: 2018  6:30 AM     Your access code will  in 90 days. If you need help or a new code, please contact your [facility] or call [phone] for assistance.        Care EveryWhere ID     This is your Care EveryWhere ID. This could be used by other organizations to access your [facility] medical records  [id number]        Your Vitals Were     Pulse                   55            Blood Pressure from Last 3 Encounters:   18 121/77   17 118/81   17 112/76    Weight from Last 3 Encounters:   16 70.3 kg (155 lb)   12/08/15 74.1 kg (163 lb 6.4 oz)               Primary Care Provider Office Phone # Fax #    Jose David Sandhu [phone]  [phone]       [facility] [address] [facility] [address]        Equal Access to Services     KIRSTEN PASCUAL : Hadii aad ku hadmooseradha Melissaali, wakingstonda barbercalistaha, daren kamioda xavierkaterina, taqueria steve rajattello aguillonineszoey hearn. So [facility] [phone].    ATENCIÓN: Si habla español, tiene a chavez disposición servicios gratuitos de asistencia lingüística. Llame al [phone].    We comply with applicable federal civil rights laws and [place] laws. We do not discriminate on the basis of race, color, national origin, age, disability, sex, sexual orientation, or gender identity.            Thank you!     Thank you for choosing [facility] DERMATOLOGY  for your care. Our goal is always to provide you with excellent care. Hearing back from our patients is one way we can continue to improve our services. Please take a few minutes to complete the written survey that you may receive in the mail after your visit with us. Thank you!             Your Updated Medication List - Protect others around you: Learn how to safely use, store and throw away your medicines at www.disposemymeds.org.          This list is accurate as of 3/30/18 11:59 PM.  Always use your most recent med list.                   Brand Name Dispense Instructions for use Diagnosis    clobetasol propionate 0.05 % Sham     118 mL    Apply to dry scalp, leave in for 10 minutes and wash out; do this once weekly.    Loss of hair       * DERMA-SMOOTHE/FS SCALP 0.01 % Oil oil   Generic drug:  Fluocinolone Acetonide Scalp     118 mL    Use this once per week overnight, may use up to twice per week.    Loss of hair       * fluocinolone acetonide 0.01 % oil     118.28 mL    Apply topically every 7 days    Androgenetic alopecia       EFFEXOR XR 75 MG 24 hr capsule   Generic drug:  venlafaxine      Take by mouth daily        HYDROXYZINE HCL PO      Take 25 mg by mouth as needed Every to 6 hours PRN    Loss of hair       ketoconazole 2 % shampoo    NIZORAL     120 mL    Use to shampoo scalp/hair; lather into scalp and leave in for 3 minutes and wash out twice per week    Loss of hair       PENICILLIN V POTASSIUM PO      Take 2 tablets by mouth every 12 hours Reported on 3/21/2017        * spironolactone 50 MG tablet    ALDACTONE    180 tablet    Take 1 tablet (50 mg) by mouth 2 times daily    Androgenetic alopecia       * spironolactone 50 MG tablet    ALDACTONE    120 tablet    Take 2 tablets (100 mg) in the morning, and one tablet (50mg) in the evening every day.    Loss of hair       traZODone 50 MG tablet    DESYREL     Take by mouth At Bedtime        WELLBUTRIN XL PO      Take 300 mg by mouth daily        * Notice:  This list has 4 medication(s) that are the same as other medications prescribed for you. Read the directions carefully, and ask your doctor or other care provider to review them with you.

## 2018-03-30 NOTE — NURSING NOTE
Dermatology Rooming Note    Cary Flores's goals for this visit include:   Chief Complaint   Patient presents with     Derm Problem     Cary is here for a hairloss follow up, states theres some areas that are worse.        Nell Claros LPN

## 2018-04-08 PROBLEM — R79.89 LOW VITAMIN D LEVEL: Status: ACTIVE | Noted: 2018-04-08

## 2018-04-11 NOTE — TELEPHONE ENCOUNTER
Attempted to reach pt in regards to requested referral. VM is full. It is unclear if she is still in need of this as she had an appt with Dr Hall after this request. If she calls back please ask if she would like this referral.

## 2018-05-03 NOTE — TELEPHONE ENCOUNTER
Spoke with Cary who stated she had what she needed regarding referral from Dr Hall. She was given the names of Anahi Barlow and Chelsie Estrada. Nothing further needed at this time.